# Patient Record
Sex: FEMALE | Race: WHITE | NOT HISPANIC OR LATINO | Employment: OTHER | RURAL
[De-identification: names, ages, dates, MRNs, and addresses within clinical notes are randomized per-mention and may not be internally consistent; named-entity substitution may affect disease eponyms.]

---

## 2017-11-21 ENCOUNTER — HOSPITAL ENCOUNTER (OUTPATIENT)
Dept: PHYSICAL THERAPY | Facility: HOSPITAL | Age: 67
Setting detail: RECURRING SERIES
Discharge: HOME | End: 2017-12-19
Attending: INTERNAL MEDICINE | Admitting: INTERNAL MEDICINE

## 2018-02-05 ENCOUNTER — HOSPITAL ENCOUNTER (OUTPATIENT)
Dept: PAIN MEDICINE | Facility: HOSPITAL | Age: 68
Discharge: HOME | End: 2018-02-05
Attending: ANESTHESIOLOGY | Admitting: ANESTHESIOLOGY

## 2018-03-01 ENCOUNTER — HOSPITAL ENCOUNTER (OUTPATIENT)
Dept: GENERAL RADIOLOGY | Facility: HOSPITAL | Age: 68
Discharge: HOME | End: 2018-03-01
Attending: ANESTHESIOLOGY | Admitting: ANESTHESIOLOGY

## 2018-03-01 ENCOUNTER — HOSPITAL ENCOUNTER (OUTPATIENT)
Dept: PAIN MEDICINE | Facility: HOSPITAL | Age: 68
Discharge: HOME | End: 2018-03-01
Attending: ANESTHESIOLOGY | Admitting: ANESTHESIOLOGY

## 2018-03-15 ENCOUNTER — HOSPITAL ENCOUNTER (OUTPATIENT)
Dept: PAIN MEDICINE | Facility: HOSPITAL | Age: 68
Discharge: HOME | End: 2018-03-15
Attending: ANESTHESIOLOGY | Admitting: ANESTHESIOLOGY

## 2018-10-03 ENCOUNTER — HOSPITAL ENCOUNTER (OUTPATIENT)
Dept: ONCOLOGY | Facility: HOSPITAL | Age: 68
Discharge: HOME | End: 2018-10-03
Attending: INTERNAL MEDICINE | Admitting: INTERNAL MEDICINE

## 2018-10-03 ENCOUNTER — CLINICAL SUPPORT (OUTPATIENT)
Dept: ONCOLOGY | Facility: HOSPITAL | Age: 68
End: 2018-10-03

## 2018-10-03 ENCOUNTER — HOSPITAL ENCOUNTER (OUTPATIENT)
Dept: ONCOLOGY | Facility: CLINIC | Age: 68
Setting detail: INFUSION SERIES
Discharge: HOME | End: 2018-10-03
Attending: INTERNAL MEDICINE | Admitting: INTERNAL MEDICINE

## 2018-10-03 LAB
ALBUMIN SERPL-MCNC: 4 G/DL (ref 3.5–4.8)
ALBUMIN/GLOB SERPL: 1.3 {RATIO} (ref 1–1.7)
ALP SERPL-CCNC: 86 IU/L (ref 32–91)
ALT SERPL-CCNC: 15 IU/L (ref 14–54)
ANION GAP SERPL CALC-SCNC: 17.2 MMOL/L (ref 10–20)
AST SERPL-CCNC: 22 IU/L (ref 15–41)
BILIRUB SERPL-MCNC: 1 MG/DL (ref 0.3–1.2)
BUN SERPL-MCNC: 14 MG/DL (ref 8–20)
BUN/CREAT SERPL: 15.6 (ref 5.4–26.2)
CALCIUM SERPL-MCNC: 9.9 MG/DL (ref 8.9–10.3)
CHLORIDE SERPL-SCNC: 103 MMOL/L (ref 101–111)
CONV CO2: 25 MMOL/L (ref 22–32)
CONV TOTAL PROTEIN: 7.2 G/DL (ref 6.1–7.9)
CREAT UR-MCNC: 0.9 MG/DL (ref 0.4–1)
GLOBULIN UR ELPH-MCNC: 3.2 G/DL (ref 2.5–3.8)
GLUCOSE SERPL-MCNC: 95 MG/DL (ref 65–99)
POTASSIUM SERPL-SCNC: 4.2 MMOL/L (ref 3.6–5.1)
SODIUM SERPL-SCNC: 141 MMOL/L (ref 136–144)

## 2018-10-03 NOTE — PROGRESS NOTES
PATIENTS ONCOLOGY RECORD LOCATED IN [facility]      Subjective     Name:  JENNIFER SWEENEY     Date:  10/03/2018  Address:  [address] [place] [address]  Home: [phone]  :  1950 AGE:  68 y.o.        RECORDS OBTAINED:  Patients Oncology Record is located in [facility]

## 2018-10-11 ENCOUNTER — HOSPITAL ENCOUNTER (OUTPATIENT)
Dept: OTHER | Facility: HOSPITAL | Age: 68
Discharge: HOME | End: 2018-10-11
Attending: INTERNAL MEDICINE | Admitting: INTERNAL MEDICINE

## 2018-11-08 ENCOUNTER — CLINICAL SUPPORT (OUTPATIENT)
Dept: ONCOLOGY | Facility: HOSPITAL | Age: 68
End: 2018-11-08

## 2018-11-08 ENCOUNTER — HOSPITAL ENCOUNTER (OUTPATIENT)
Dept: ONCOLOGY | Facility: CLINIC | Age: 68
Setting detail: INFUSION SERIES
Discharge: HOME | End: 2018-11-08
Attending: INTERNAL MEDICINE | Admitting: INTERNAL MEDICINE

## 2018-11-08 NOTE — PROGRESS NOTES
PATIENTS ONCOLOGY RECORD LOCATED IN [facility]      Subjective     Name:  JENNIFER SWEENEY     Date:  2018  Address:  [address] [place] [address]  Home: [phone]  :  1950 AGE:  68 y.o.        RECORDS OBTAINED:  Patients Oncology Record is located in [facility]

## 2019-02-19 ENCOUNTER — OFFICE (AMBULATORY)
Dept: URBAN - METROPOLITAN AREA CLINIC 64 | Facility: CLINIC | Age: 69
End: 2019-02-19

## 2019-02-19 VITALS
HEIGHT: 66 IN | WEIGHT: 150 LBS | DIASTOLIC BLOOD PRESSURE: 99 MMHG | SYSTOLIC BLOOD PRESSURE: 178 MMHG | HEART RATE: 71 BPM

## 2019-02-19 DIAGNOSIS — Z79.01 LONG TERM (CURRENT) USE OF ANTICOAGULANTS: ICD-10-CM

## 2019-02-19 DIAGNOSIS — Z12.11 ENCOUNTER FOR SCREENING FOR MALIGNANT NEOPLASM OF COLON: ICD-10-CM

## 2019-02-19 PROCEDURE — 99203 OFFICE O/P NEW LOW 30 MIN: CPT | Performed by: NURSE PRACTITIONER

## 2019-03-05 ENCOUNTER — HOSPITAL ENCOUNTER (OUTPATIENT)
Dept: OTHER | Facility: HOSPITAL | Age: 69
Setting detail: SPECIMEN
Discharge: HOME | End: 2019-03-05
Attending: INTERNAL MEDICINE | Admitting: INTERNAL MEDICINE

## 2019-03-05 ENCOUNTER — ON CAMPUS - OUTPATIENT (AMBULATORY)
Dept: URBAN - METROPOLITAN AREA HOSPITAL 2 | Facility: HOSPITAL | Age: 69
End: 2019-03-05
Payer: COMMERCIAL

## 2019-03-05 ENCOUNTER — OFFICE (AMBULATORY)
Dept: URBAN - METROPOLITAN AREA PATHOLOGY 4 | Facility: PATHOLOGY | Age: 69
End: 2019-03-05

## 2019-03-05 VITALS
DIASTOLIC BLOOD PRESSURE: 100 MMHG | SYSTOLIC BLOOD PRESSURE: 106 MMHG | SYSTOLIC BLOOD PRESSURE: 104 MMHG | HEART RATE: 82 BPM | WEIGHT: 145 LBS | DIASTOLIC BLOOD PRESSURE: 59 MMHG | DIASTOLIC BLOOD PRESSURE: 86 MMHG | DIASTOLIC BLOOD PRESSURE: 79 MMHG | HEIGHT: 66 IN | DIASTOLIC BLOOD PRESSURE: 72 MMHG | SYSTOLIC BLOOD PRESSURE: 137 MMHG | DIASTOLIC BLOOD PRESSURE: 76 MMHG | HEART RATE: 88 BPM | SYSTOLIC BLOOD PRESSURE: 184 MMHG | SYSTOLIC BLOOD PRESSURE: 120 MMHG | OXYGEN SATURATION: 98 % | HEART RATE: 83 BPM | HEART RATE: 74 BPM | HEART RATE: 85 BPM | HEART RATE: 78 BPM | HEART RATE: 80 BPM | SYSTOLIC BLOOD PRESSURE: 115 MMHG | DIASTOLIC BLOOD PRESSURE: 66 MMHG | SYSTOLIC BLOOD PRESSURE: 143 MMHG | SYSTOLIC BLOOD PRESSURE: 175 MMHG | RESPIRATION RATE: 20 BRPM | OXYGEN SATURATION: 96 % | RESPIRATION RATE: 18 BRPM | SYSTOLIC BLOOD PRESSURE: 147 MMHG | DIASTOLIC BLOOD PRESSURE: 71 MMHG | HEART RATE: 84 BPM | RESPIRATION RATE: 16 BRPM | DIASTOLIC BLOOD PRESSURE: 70 MMHG | DIASTOLIC BLOOD PRESSURE: 69 MMHG | OXYGEN SATURATION: 100 % | OXYGEN SATURATION: 99 %

## 2019-03-05 DIAGNOSIS — D12.2 BENIGN NEOPLASM OF ASCENDING COLON: ICD-10-CM

## 2019-03-05 DIAGNOSIS — D12.0 BENIGN NEOPLASM OF CECUM: ICD-10-CM

## 2019-03-05 DIAGNOSIS — D12.3 BENIGN NEOPLASM OF TRANSVERSE COLON: ICD-10-CM

## 2019-03-05 DIAGNOSIS — Z12.11 ENCOUNTER FOR SCREENING FOR MALIGNANT NEOPLASM OF COLON: ICD-10-CM

## 2019-03-05 DIAGNOSIS — K57.30 DIVERTICULOSIS OF LARGE INTESTINE WITHOUT PERFORATION OR ABS: ICD-10-CM

## 2019-03-05 LAB
GI HISTOLOGY: A. UNSPECIFIED: (no result)
GI HISTOLOGY: B. UNSPECIFIED: (no result)
GI HISTOLOGY: C. UNSPECIFIED: (no result)
GI HISTOLOGY: PDF REPORT: (no result)

## 2019-03-05 PROCEDURE — 45385 COLONOSCOPY W/LESION REMOVAL: CPT | Mod: PT | Performed by: INTERNAL MEDICINE

## 2019-03-05 PROCEDURE — 88305 TISSUE EXAM BY PATHOLOGIST: CPT | Mod: 26 | Performed by: INTERNAL MEDICINE

## 2019-03-07 ENCOUNTER — HOSPITAL ENCOUNTER (OUTPATIENT)
Dept: ONCOLOGY | Facility: CLINIC | Age: 69
Setting detail: INFUSION SERIES
Discharge: HOME | End: 2019-03-07
Attending: INTERNAL MEDICINE | Admitting: INTERNAL MEDICINE

## 2019-03-07 ENCOUNTER — CLINICAL SUPPORT (OUTPATIENT)
Dept: ONCOLOGY | Facility: HOSPITAL | Age: 69
End: 2019-03-07

## 2019-03-07 NOTE — PROGRESS NOTES
PATIENTS ONCOLOGY RECORD LOCATED IN [facility]      Subjective     Name:  JENNIFER SWEENEY     Date:  2019  Address:  [address] [place] [address]  Home: [unfilled]  :  1950 AGE:  68 y.o.        RECORDS OBTAINED:  Patients Oncology Record is located in [facility]

## 2019-07-10 DIAGNOSIS — I82.4Y1 DEEP VEIN THROMBOSIS (DVT) OF PROXIMAL VEIN OF RIGHT LOWER EXTREMITY, UNSPECIFIED CHRONICITY (HCC): Primary | ICD-10-CM

## 2019-07-10 PROBLEM — R76.0 LUPUS ANTICOAGULANT POSITIVE: Status: ACTIVE | Noted: 2019-07-10

## 2019-07-10 PROBLEM — I82.401 ACUTE DEEP VEIN THROMBOSIS (DVT) OF RIGHT LOWER EXTREMITY (HCC): Status: ACTIVE | Noted: 2019-07-10

## 2019-07-10 PROBLEM — R79.1 ELEVATED FACTOR VIII LEVEL: Status: ACTIVE | Noted: 2019-07-10

## 2019-07-10 PROBLEM — I26.99 BILATERAL PULMONARY EMBOLISM (HCC): Status: ACTIVE | Noted: 2019-07-10

## 2019-07-10 PROBLEM — R91.1 NODULE OF UPPER LOBE OF RIGHT LUNG: Status: ACTIVE | Noted: 2019-07-10

## 2019-07-10 NOTE — PROGRESS NOTES
Hematology/Oncology Outpatient Follow Up    Patient name:Karol Cid  :1950  MRN: [id number]  Primary Care Physician: Chel Luna MD  Referring Physician: Chel Luna MD    Chief Complaint   Patient presents with   • Right lower extremity DVT     follow up         History of Present Illness:   1.   Right lower extremity DVT and bilateral pulmonary emboli with saddle embolus diagnosed 2018.   • 3/27/18 - Patient was hospitalized at [facility] between 3/27/18 and 18 through the Emergency Room with weakness and confusion. The patient had received lumbar injections for degenerative disc disease with third injection given approximately two weeks prior to admission. Spouse reported patient had been very active prior to back pain with some decrease in activity prior to admission. Neurology saw patient and the LP was positive for varicella zoster. ID was consulted and patient started on Acyclovir in addition to antibiotics. Cymbalta was ordered for confusion. WBC was 13.1, hemoglobin 14.6, platelet count 297,000. D-dimer was elevated at 6.49 (0.17-0.59). CT abdomen and pelvis had marked rectosigmoid wall thickening with associated stranding consistent with non-specific colitis/proctitis. Bibasilar atelectasis with small left pleural effusion were seen. MRI brain had no acute intracranial pathology. Chronic changes of volume loss and microvascular ischemia were noted. CT chest PE protocol revealed bilateral pulmonary emboli including a saddle embolus. Minor basilar atelectasis and tiny left pleural effusions were seen. MRI lumbar spine had revealed mild degenerative changes with mild right-sided neuroforaminal stenosis at L5-S1 and L4-L5. Echocardiogram had mild tricuspid regurgitation with LV size and contractility normal with EF about 60%. Bilateral lower extremity venous Doppler revealed isolated right gastrocnemius vein DVT. Factor VIII 165 (). Comprehensive metabolic panel had no significant  abnormality. C-diff was positive, treated with oral Vancomycin. Lupus anticoagulant was weakly positive. Antiphospholipid antibodies were negative. Prothrombin [id number] mutation was not detected. Factor V Leiden mutation screen, APC-RV 3.0 (>2). HIV 1/2  antibody negative. Protein C activity 105.2 (), protein S activity 83.7 (). Patient was threated with low therapeutic doses of Lovenox changed to Eliquis prior to discharge.   • 4/9/18 - Patient had followup appointment to see me in the office but was hospitalized at [facility] again between 4/9/18 and 4/16/18 with acute renal failure, herpes zoster encephalitis, C-diff colitis and accelerated hypertension. The renal failure was thought secondary to Acyclovir toxicity and she was eventually discharged home.   • 9/5/18 - Patient was seen in followup by her primary care physician, Chel Luna M.D., wanting to come off Eliquis and was referred here for further evaluation.   • 10/3/18 - [facility] followup of her hospitalization at [facility]. WBC 14.4 with 59% neutrophils, 36% lymphocytes, 4% monocytes, hemoglobin 14.7, platelet count 324,000.  Factor VIII activity 129 (). Antithrombin III activity 134 (). D-dimer <0.22 (0.45).      • 10/11/18 - CT chest with contrast with no evidence of pulmonary embolism with interval resolution of previously-seen multifocal pulmonary emboli. Upper lung predominant emphysematous changes and scarring. Stable 3 mm right upper lobe pulmonary nodule. Venous Doppler right lower extremity with no evidence of any DVT or SVT. Lymph node noted in the right groin.   • 11/8/18 - Discussed workup results. Patient wanting to come off the Eliquis. Made her aware that she should come off Raloxifene or change to a different medicine as that will be a risk factor for her to get blood clots again. Eliquis dose decreased to 2.5 mg p.o. b.i.d. Homocysteine 13.2 (<15).  • 3/5/19 colonoscopy by  Cristhian Mosley MD revealed 3 to 9 mm polyps in the cecum, ascending colon and transverse colon which on pathology were tubular adenomas.  Repeat colonoscopy recommended in 3 years.  •  3/7/19 - Patient claims to have stopped using the Raloxifene in January 2019 and had a followup appointment with Dr. Luna. Has not been taking Eliquis since her recent colonoscopy and asked patient to discontinue it as she is not on Raloxifene anymore.  Factor VIII activity 152 ().  • 7/11/19 discussed elevated factor VIII level and lupus anticoagulant positivity with patient.  Recommended aspirin 81 mg p.o. daily.  Made her aware that these values need to be repeated.  Patient claims that she is being followed by Dr. Price every 3 months and will have these repeated by her.  Changed patient follow-up to as needed.    Past Medical History:   Diagnosis Date   • Anxiety disorder    • Depression    • Osteoporosis        Past Surgical History:   Procedure Laterality Date   • CATARACT EXTRACTION, BILATERAL  04/2012   • SKIN CANCER EXCISION Left     Skin cancer removed over left eye         Current Outpatient Medications:   •  Calcium Citrate-Vitamin D 200-125 MG-UNIT tablet, CALCIUM + D TABS, Disp: , Rfl:   •  mirtazapine (REMERON) 30 MG tablet, Daily., Disp: , Rfl:   •  Wheat Dextrin (BENEFIBER PO), Take  by mouth., Disp: , Rfl:   •  clonazePAM (KlonoPIN) 0.5 MG tablet, , Disp: , Rfl:   •  latanoprost (XALATAN) 0.005 % ophthalmic solution, , Disp: , Rfl:   •  metoprolol tartrate (LOPRESSOR) 50 MG tablet, , Disp: , Rfl:     No Known Allergies    No family history on file.    Cancer-related family history is not on file.    Social History     Tobacco Use   • Smoking status: Former Smoker   Substance Use Topics   • Alcohol use: Not on file   • Drug use: Not on file       I have reviewed the history of present illness, past medical history, family history, social history, lab results, all notes and other records since the  "patient was last seen on 3/7/19.    SUBJECTIVE: Patient is here for follow up of right lower extremity DVT and bilateral pulmonary emboli.     MYRNA Botello present during office visit.           ROS:  Review of Systems   Constitutional: Negative for chills and fever.   HENT: Negative for ear pain, mouth sores, nosebleeds and sore throat.    Eyes: Negative for photophobia and visual disturbance.   Respiratory: Negative for wheezing and stridor.    Cardiovascular: Negative for chest pain and palpitations.   Gastrointestinal: Negative for abdominal pain, diarrhea, nausea and vomiting.   Endocrine: Negative for cold intolerance and heat intolerance.   Genitourinary: Negative for dysuria and hematuria.   Musculoskeletal: Negative for joint swelling and neck stiffness.   Skin: Negative for color change and rash.        Scattered ecchymosis    Neurological: Negative for seizures and syncope.   Hematological: Negative for adenopathy.        No obvious bleeding   Psychiatric/Behavioral: Negative for agitation, confusion and hallucinations.       Objective:    Vitals:    07/11/19 1048   BP: 148/90   Pulse: 79   Resp: 18   Temp: 98.4 °F (36.9 °C)   Weight: 71.7 kg (158 lb)   Height: 165.1 cm (65\")   PainSc: 0-No pain       ECOG  (1) Restricted in physically strenuous activity, ambulatory and able to do work of light nature    Physical Exam   Constitutional: She is oriented to person, place, and time. No distress.   HENT:   Head: Normocephalic and atraumatic.   Eyes: Conjunctivae and EOM are normal. Right eye exhibits no discharge. Left eye exhibits no discharge. No scleral icterus.   Neck: Normal range of motion. Neck supple. No thyromegaly present.   Cardiovascular: Normal rate, regular rhythm and normal heart sounds. Exam reveals no gallop and no friction rub.   Pulmonary/Chest: Effort normal. No stridor. No respiratory distress. She has no wheezes.   Abdominal: Soft. Bowel sounds are normal. She exhibits no mass. " There is no tenderness. There is no rebound and no guarding.   Musculoskeletal: Normal range of motion. She exhibits no tenderness.   Trace pedal edema.    Lymphadenopathy:     She has no cervical adenopathy.   Neurological: She is alert and oriented to person, place, and time. She exhibits normal muscle tone.   Skin: Skin is warm. No rash noted. She is not diaphoretic. No erythema.   Psychiatric: She has a normal mood and affect. Her behavior is normal.   Nursing note and vitals reviewed.      RECENT LABS  WBC   Date Value Ref Range Status   07/11/2019 10.88 (H) 3.40 - 10.80 10*3/mm3 Final     RBC   Date Value Ref Range Status   07/11/2019 4.95 3.77 - 5.28 10*6/mm3 Final     Hemoglobin   Date Value Ref Range Status   07/11/2019 14.3 12.0 - 15.9 g/dL Final     Hematocrit   Date Value Ref Range Status   07/11/2019 44.2 34.0 - 46.6 % Final     MCV   Date Value Ref Range Status   07/11/2019 89.3 79.0 - 97.0 fL Final     MCH   Date Value Ref Range Status   07/11/2019 28.9 26.6 - 33.0 pg Final     MCHC   Date Value Ref Range Status   07/11/2019 32.4 31.5 - 35.7 g/dL Final     RDW   Date Value Ref Range Status   07/11/2019 13.4 12.3 - 15.4 % Final     RDW-SD   Date Value Ref Range Status   07/11/2019 43.0 37.0 - 54.0 fl Final     MPV   Date Value Ref Range Status   07/11/2019 10.3 6.0 - 12.0 fL Final     Platelets   Date Value Ref Range Status   07/11/2019 307 140 - 450 10*3/mm3 Final     Neutrophil %   Date Value Ref Range Status   07/11/2019 47.2 42.7 - 76.0 % Final     Lymphocyte %   Date Value Ref Range Status   07/11/2019 45.1 19.6 - 45.3 % Final     Monocyte %   Date Value Ref Range Status   07/11/2019 6.3 5.0 - 12.0 % Final     Eosinophil %   Date Value Ref Range Status   07/11/2019 1.3 0.3 - 6.2 % Final     Basophil %   Date Value Ref Range Status   07/11/2019 0.1 0.0 - 1.5 % Final     Neutrophils, Absolute   Date Value Ref Range Status   07/11/2019 5.14 1.70 - 7.00 10*3/mm3 Final     Lymphocytes, Absolute   Date  Value Ref Range Status   07/11/2019 4.91 (H) 0.70 - 3.10 10*3/mm3 Final     Monocytes, Absolute   Date Value Ref Range Status   07/11/2019 0.68 0.10 - 0.90 10*3/mm3 Final     Eosinophils, Absolute   Date Value Ref Range Status   07/11/2019 0.14 0.00 - 0.40 10*3/mm3 Final     Basophils, Absolute   Date Value Ref Range Status   07/11/2019 0.01 0.00 - 0.20 10*3/mm3 Final     nRBC   Date Value Ref Range Status   04/16/2018 0 0 /100[WBCs] Final       Lab Results   Component Value Date    GLUCOSE 95 10/03/2018    BUN 14 10/03/2018    CREATININE 0.9 10/03/2018    BCR 15.6 10/03/2018    K 4.2 10/03/2018    CO2 25 10/03/2018    CALCIUM 9.9 10/03/2018    ALBUMIN 4.0 10/03/2018    LABIL2 1.3 10/03/2018    AST 22 10/03/2018    ALT 15 10/03/2018         Assessment/Plan     Deep vein thrombosis (DVT) of proximal vein of right lower extremity, unspecified chronicity (CMS/HCC)  - CBC & Differential  - CBC Auto Differential    Bilateral pulmonary embolism (CMS/HCC)    Elevated factor VIII level    Lupus anticoagulant positive    Nodule of upper lobe of right lung      ASSESSMENT:  She is not on any anticoagulant elevated factor VIII level lupus anticoagulant positivity.  Advised for her to take aspirin 81 mg p.o. daily.  Recommended for her to have the factor VIII level and lupus anticoagulant repeated.  She claims that she sees Dr. Luna every 3 months and will have her repeated by her.  Her lung nodules are stable.      PLAN:  Start ASA 81mg po daily.  Return to clinic as needed.      I have reviewed labs results, imaging, vitals, and medications with the patient today. Will follow up as needed.     Patient verbalized understanding and is in agreement of the above plan.    I have reviewed and validated the information above.   Herbie Dean M.D., F.A.C.P.             Much of the above report is an electronic transcription//translation of the spoken language to printed text using Dragon Software. As such, the subtleties and  finesse of the spoken language may permit erroneous, or at times, nonsensical words or phrases to be inadvertently transcribed; thus changes may be made at a later date to rectify these errors.

## 2019-07-11 ENCOUNTER — APPOINTMENT (OUTPATIENT)
Dept: LAB | Facility: HOSPITAL | Age: 69
End: 2019-07-11

## 2019-07-11 ENCOUNTER — OFFICE VISIT (OUTPATIENT)
Dept: ONCOLOGY | Facility: CLINIC | Age: 69
End: 2019-07-11

## 2019-07-11 VITALS
HEART RATE: 79 BPM | HEIGHT: 65 IN | RESPIRATION RATE: 18 BRPM | SYSTOLIC BLOOD PRESSURE: 148 MMHG | BODY MASS INDEX: 26.33 KG/M2 | TEMPERATURE: 98.4 F | DIASTOLIC BLOOD PRESSURE: 90 MMHG | WEIGHT: 158 LBS

## 2019-07-11 DIAGNOSIS — I26.99 BILATERAL PULMONARY EMBOLISM (HCC): ICD-10-CM

## 2019-07-11 DIAGNOSIS — R79.1 ELEVATED FACTOR VIII LEVEL: ICD-10-CM

## 2019-07-11 DIAGNOSIS — R91.1 NODULE OF UPPER LOBE OF RIGHT LUNG: ICD-10-CM

## 2019-07-11 DIAGNOSIS — I82.4Y1 DEEP VEIN THROMBOSIS (DVT) OF PROXIMAL VEIN OF RIGHT LOWER EXTREMITY, UNSPECIFIED CHRONICITY (HCC): Primary | ICD-10-CM

## 2019-07-11 DIAGNOSIS — R76.0 LUPUS ANTICOAGULANT POSITIVE: ICD-10-CM

## 2019-07-11 LAB
BASOPHILS # BLD AUTO: 0.01 10*3/MM3 (ref 0–0.2)
BASOPHILS NFR BLD AUTO: 0.1 % (ref 0–1.5)
DEPRECATED RDW RBC AUTO: 43 FL (ref 37–54)
EOSINOPHIL # BLD AUTO: 0.14 10*3/MM3 (ref 0–0.4)
EOSINOPHIL NFR BLD AUTO: 1.3 % (ref 0.3–6.2)
ERYTHROCYTE [DISTWIDTH] IN BLOOD BY AUTOMATED COUNT: 13.4 % (ref 12.3–15.4)
HCT VFR BLD AUTO: 44.2 % (ref 34–46.6)
HGB BLD-MCNC: 14.3 G/DL (ref 12–15.9)
LYMPHOCYTES # BLD AUTO: 4.91 10*3/MM3 (ref 0.7–3.1)
LYMPHOCYTES NFR BLD AUTO: 45.1 % (ref 19.6–45.3)
MCH RBC QN AUTO: 28.9 PG (ref 26.6–33)
MCHC RBC AUTO-ENTMCNC: 32.4 G/DL (ref 31.5–35.7)
MCV RBC AUTO: 89.3 FL (ref 79–97)
MONOCYTES # BLD AUTO: 0.68 10*3/MM3 (ref 0.1–0.9)
MONOCYTES NFR BLD AUTO: 6.3 % (ref 5–12)
NEUTROPHILS # BLD AUTO: 5.14 10*3/MM3 (ref 1.7–7)
NEUTROPHILS NFR BLD AUTO: 47.2 % (ref 42.7–76)
PLATELET # BLD AUTO: 307 10*3/MM3 (ref 140–450)
PMV BLD AUTO: 10.3 FL (ref 6–12)
RBC # BLD AUTO: 4.95 10*6/MM3 (ref 3.77–5.28)
WBC NRBC COR # BLD: 10.88 10*3/MM3 (ref 3.4–10.8)

## 2019-07-11 PROCEDURE — 85025 COMPLETE CBC W/AUTO DIFF WBC: CPT | Performed by: INTERNAL MEDICINE

## 2019-07-11 PROCEDURE — 99213 OFFICE O/P EST LOW 20 MIN: CPT | Performed by: INTERNAL MEDICINE

## 2019-07-11 PROCEDURE — 36415 COLL VENOUS BLD VENIPUNCTURE: CPT | Performed by: INTERNAL MEDICINE

## 2019-07-11 RX ORDER — MIRTAZAPINE 30 MG/1
TABLET, FILM COATED ORAL EVERY 24 HOURS
COMMUNITY
Start: 2018-01-18

## 2019-07-11 RX ORDER — METOPROLOL TARTRATE 50 MG/1
TABLET, FILM COATED ORAL
COMMUNITY
Start: 2019-05-27

## 2019-07-11 RX ORDER — CLONAZEPAM 0.5 MG/1
TABLET ORAL
COMMUNITY
Start: 2019-06-23

## 2019-07-11 RX ORDER — LATANOPROST 50 UG/ML
SOLUTION/ DROPS OPHTHALMIC
COMMUNITY
Start: 2019-06-23

## 2021-09-13 ENCOUNTER — TRANSCRIBE ORDERS (OUTPATIENT)
Dept: ADMINISTRATIVE | Facility: HOSPITAL | Age: 71
End: 2021-09-13

## 2021-09-13 DIAGNOSIS — Z12.31 ENCOUNTER FOR SCREENING MAMMOGRAM FOR MALIGNANT NEOPLASM OF BREAST: Primary | ICD-10-CM

## 2022-04-15 ENCOUNTER — OFFICE (AMBULATORY)
Dept: URBAN - METROPOLITAN AREA PATHOLOGY 4 | Facility: PATHOLOGY | Age: 72
End: 2022-04-15

## 2022-04-15 ENCOUNTER — ON CAMPUS - OUTPATIENT (AMBULATORY)
Dept: URBAN - METROPOLITAN AREA HOSPITAL 2 | Facility: HOSPITAL | Age: 72
End: 2022-04-15

## 2022-04-15 ENCOUNTER — OFFICE (AMBULATORY)
Dept: URBAN - METROPOLITAN AREA PATHOLOGY 4 | Facility: PATHOLOGY | Age: 72
End: 2022-04-15
Payer: COMMERCIAL

## 2022-04-15 VITALS
DIASTOLIC BLOOD PRESSURE: 67 MMHG | DIASTOLIC BLOOD PRESSURE: 60 MMHG | SYSTOLIC BLOOD PRESSURE: 114 MMHG | SYSTOLIC BLOOD PRESSURE: 110 MMHG | SYSTOLIC BLOOD PRESSURE: 116 MMHG | TEMPERATURE: 97.5 F | RESPIRATION RATE: 18 BRPM | SYSTOLIC BLOOD PRESSURE: 84 MMHG | SYSTOLIC BLOOD PRESSURE: 136 MMHG | HEART RATE: 78 BPM | RESPIRATION RATE: 16 BRPM | OXYGEN SATURATION: 96 % | DIASTOLIC BLOOD PRESSURE: 61 MMHG | RESPIRATION RATE: 14 BRPM | DIASTOLIC BLOOD PRESSURE: 45 MMHG | SYSTOLIC BLOOD PRESSURE: 133 MMHG | HEIGHT: 66 IN | DIASTOLIC BLOOD PRESSURE: 71 MMHG | HEART RATE: 72 BPM | SYSTOLIC BLOOD PRESSURE: 118 MMHG | HEART RATE: 74 BPM | DIASTOLIC BLOOD PRESSURE: 85 MMHG | OXYGEN SATURATION: 99 % | OXYGEN SATURATION: 93 % | SYSTOLIC BLOOD PRESSURE: 158 MMHG | WEIGHT: 168 LBS | HEART RATE: 77 BPM | HEART RATE: 75 BPM | DIASTOLIC BLOOD PRESSURE: 116 MMHG

## 2022-04-15 DIAGNOSIS — Z86.010 PERSONAL HISTORY OF COLONIC POLYPS: ICD-10-CM

## 2022-04-15 DIAGNOSIS — K62.1 RECTAL POLYP: ICD-10-CM

## 2022-04-15 DIAGNOSIS — D12.3 BENIGN NEOPLASM OF TRANSVERSE COLON: ICD-10-CM

## 2022-04-15 DIAGNOSIS — K57.30 DIVERTICULOSIS OF LARGE INTESTINE WITHOUT PERFORATION OR ABS: ICD-10-CM

## 2022-04-15 PROBLEM — K63.5 POLYP OF COLON: Status: ACTIVE | Noted: 2022-04-15

## 2022-04-15 LAB
GI HISTOLOGY: A. UNSPECIFIED: (no result)
GI HISTOLOGY: B. UNSPECIFIED: (no result)
GI HISTOLOGY: PDF REPORT: (no result)

## 2022-04-15 PROCEDURE — 88305 TISSUE EXAM BY PATHOLOGIST: CPT | Mod: 26 | Performed by: INTERNAL MEDICINE

## 2022-04-15 PROCEDURE — 45385 COLONOSCOPY W/LESION REMOVAL: CPT | Mod: PT | Performed by: INTERNAL MEDICINE

## 2023-10-27 ENCOUNTER — TRANSCRIBE ORDERS (OUTPATIENT)
Dept: PHYSICAL THERAPY | Facility: CLINIC | Age: 73
End: 2023-10-27
Payer: MEDICARE

## 2023-10-27 DIAGNOSIS — M17.12 PRIMARY OSTEOARTHRITIS OF LEFT KNEE: Primary | ICD-10-CM

## 2023-11-06 ENCOUNTER — TREATMENT (OUTPATIENT)
Dept: PHYSICAL THERAPY | Facility: CLINIC | Age: 73
End: 2023-11-06
Payer: MEDICARE

## 2023-11-06 DIAGNOSIS — M17.12 ARTHRITIS OF LEFT KNEE: ICD-10-CM

## 2023-11-06 DIAGNOSIS — M25.562 LEFT KNEE PAIN, UNSPECIFIED CHRONICITY: Primary | ICD-10-CM

## 2023-11-06 PROCEDURE — 97161 PT EVAL LOW COMPLEX 20 MIN: CPT | Performed by: PHYSICAL THERAPIST

## 2023-11-06 NOTE — PROGRESS NOTES
"Physical Therapy Initial Evaluation and Plan of Care  [address] [place] [address]     Patient: Karol Cid   : 1950  Diagnosis/ICD-10 Code:  Left knee pain, unspecified chronicity [M25.562]  Referring practitioner: Conner Sebastian MD  Date of Initial Visit: 2023  Today's Date: 2023  Patient seen for 1 sessions           Visit Diagnoses:    ICD-10-CM ICD-9-CM   1. Left knee pain, unspecified chronicity  M25.562 719.46   2. Arthritis of left knee  M17.12 716.96       Subjective Questionnaire: Knee outcomes score 59%      Subjective 72 yo female with c/o L knee pain. Reports insidious onset of pain a few months ago. Primary pain is along the anteromedial knee. Had some imaging, unsure of results. Had a knee injections at orthopedic visit, \" I guess it helped.\" 0/10 knee pain 5/10 at worst. Symptom improve with Tylenol. Symptoms worsen when ambulating stairs. Further exacerbating and alleviating factors unknown.   MD follow up: 3 months  Social hx: Retired .      Objective          Tenderness   Left Knee   Tenderness in the medial joint line.     Active Range of Motion   Left Knee   Normal active range of motion  Flexion: with pain    Right Knee   Normal active range of motion    Strength/Myotome Testing     Left Knee   Flexion: 4-  Extension: 4-    Right Knee   Normal strength    Additional Strength Details  Pain with resisted flexion    Tests     Left Knee   Positive medial Anna and Thessaly's test at 5 degrees.     Additional Tests Details  Further provocative testing is negative      Unable to maintain single leg standing on the left, right is wfl    Assessment & Plan       Assessment  Impairments: abnormal gait, abnormal or restricted ROM, activity intolerance, impaired balance, impaired physical strength, lacks appropriate home exercise program, pain with function and weight-bearing intolerance   Functional limitations: carrying objects, lifting, " walking, pulling, pushing, uncomfortable because of pain, standing and unable to perform repetitive tasks   Assessment details: 74 yo female with c/o L knee pain. Pt tolerated today's evaluation well. Recommend continuing with PT evaluation and treatment to address her impairments.  Prognosis: good    Goals  Plan Goals: Short term goals, 1 week: Tolerate HEP progression.  Voice compliance with activity modification.  Report improvement in symptoms.    LTGs, 5 weeks: Improve knee outcomes score to 75%.  AROM flexion to be non provocative of pain to allow more effective stair ambulation.  5/5 LE strength to allow safe stair ambulation.  Single leg standing to be 4 seconds or better on level surface to help mitigate fall risk.  Independent with HEP.    Plan  Therapy options: will be seen for skilled therapy services  Other planned modality interventions: modalities prn  Planned therapy interventions: balance/weight-bearing training, flexibility, functional ROM exercises, gait training, home exercise program, manual therapy, neuromuscular re-education, strengthening, stretching and therapeutic activities  Frequency: 2x week  Duration in weeks: 5  Treatment plan discussed with: patient  Plan details: 30 visits per POC, 90 day certification period         History # of Personal Factors and/or Comorbidities: MODERATE (1-2)  Examination of Body System(s): # of elements: LOW (1-2)  Clinical Presentation: STABLE   Clinical Decision Making: LOW      Timed:         Manual Therapy:         mins  00568;     Therapeutic Exercise:         mins  05912;     Neuromuscular Yenifer:        mins  84818;    Therapeutic Activity:          mins  89128;     Gait Training:           mins  53146;     Ultrasound:          mins  24241;    Ionto                                   mins   09725  Self Care                            mins   62367    Un-Timed:  Electrical Stimulation:         mins  02678 ( );  Traction          mins 29986  Low  Eval     25     Mins  41122  Mod Eval          Mins  06815  High Eval                            Mins  20490  Re-Eval                               mins  88701        Timed Treatment:   0   mins   Total Treatment:     25   mins      PT SIGNATURE: Petros Thomason PT, DPT, OCS  [place] license: [id number]  DATE TREATMENT INITIATED: 11/6/2023    Certification Period: @TDA @thru 2/3/2024  I certify that the therapy services are furnished while this patient is under my care.  The services outlined above are required for this patient and will be reviewed every 90 days.     PHYSICIAN: _____________________________    Conner Sebastian MD      DATE:     Please sign and return via fax to [unfilled] . Thank you, [facility].

## 2023-11-09 ENCOUNTER — TREATMENT (OUTPATIENT)
Dept: PHYSICAL THERAPY | Facility: CLINIC | Age: 73
End: 2023-11-09
Payer: MEDICARE

## 2023-11-09 DIAGNOSIS — M17.12 ARTHRITIS OF LEFT KNEE: ICD-10-CM

## 2023-11-09 DIAGNOSIS — M25.562 LEFT KNEE PAIN, UNSPECIFIED CHRONICITY: Primary | ICD-10-CM

## 2023-11-09 PROCEDURE — 97140 MANUAL THERAPY 1/> REGIONS: CPT | Performed by: PHYSICAL THERAPIST

## 2023-11-09 PROCEDURE — 97110 THERAPEUTIC EXERCISES: CPT | Performed by: PHYSICAL THERAPIST

## 2023-11-09 NOTE — PROGRESS NOTES
Physical Therapy Daily Treatment Note  [facility]  [address] [place] [address]     Patient: Karol Cid   : 1950   Referring practitioner: Conner Sebastian MD  Date of initial visit: Type: THERAPY  Noted: 2023   Today's date: 2023   Patient seen for 2 visits    Visit Diagnoses:    ICD-10-CM ICD-9-CM   1. Left knee pain, unspecified chronicity  M25.562 719.46   2. Arthritis of left knee  M17.12 716.96       Subjective   Pt reports: No new complaints vs IE date.       Objective     See Exercise, Manual, and Modality Logs for complete treatment.     Patient Education: HEP    Assessment/Plan Tolerated HEP introduction well.      Progress per Plan of Care            Timed:         Manual Therapy:    15     mins  22206;     Therapeutic Exercise:    15     mins  10953;     Neuromuscular Yenifer:        mins  73399;    Therapeutic Activity:          mins  88887;     Gait Training:           mins  48510;     Ultrasound:          mins  72303;    Ionto                                   mins   68283  Self Care                            mins   79028    Un-Timed:  Electrical Stimulation:         mins  55103 ( );  Traction          mins 84401  Low Eval          Mins  61572  Mod Eval          Mins  11006  High Eval                            Mins  73238  Re-Eval                               mins  99793    Timed Treatment:   30   mins   Total Treatment:     30   mins      Petros Thomason PT, DPT, OCS  [place] license: [id number]  Physical Therapist

## 2023-11-13 ENCOUNTER — TREATMENT (OUTPATIENT)
Dept: PHYSICAL THERAPY | Facility: CLINIC | Age: 73
End: 2023-11-13
Payer: MEDICARE

## 2023-11-13 DIAGNOSIS — M25.562 LEFT KNEE PAIN, UNSPECIFIED CHRONICITY: Primary | ICD-10-CM

## 2023-11-13 DIAGNOSIS — M17.12 ARTHRITIS OF LEFT KNEE: ICD-10-CM

## 2023-11-13 PROCEDURE — 97140 MANUAL THERAPY 1/> REGIONS: CPT | Performed by: PHYSICAL THERAPIST

## 2023-11-13 PROCEDURE — 97110 THERAPEUTIC EXERCISES: CPT | Performed by: PHYSICAL THERAPIST

## 2023-11-14 NOTE — PROGRESS NOTES
Physical Therapy Daily Treatment Note  [facility]  [address] [place] [address]     Patient: Karol Cid   : 1950   Referring practitioner: Conner Sebastian MD  Date of initial visit: Type: THERAPY  Noted: 2023   Today's date: 2023   Patient seen for 3 visits    Visit Diagnoses:    ICD-10-CM ICD-9-CM   1. Left knee pain, unspecified chronicity  M25.562 719.46   2. Arthritis of left knee  M17.12 716.96       Subjective   Pt reports: Knee pain somewhat improved. HEP going well.      Objective     See Exercise, Manual, and Modality Logs for complete treatment.     Patient Education: HEP    Assessment/Plan LE fatigued post visit.      Progress per Plan of Care            Timed:         Manual Therapy:    10     mins  73425;     Therapeutic Exercise:    20     mins  58454;     Neuromuscular Yenifer:        mins  23728;    Therapeutic Activity:          mins  85944;     Gait Training:           mins  03448;     Ultrasound:          mins  68510;    Ionto                                   mins   68121  Self Care                            mins   45314    Un-Timed:  Electrical Stimulation:         mins  55285 ( );  Traction          mins 60114  Low Eval          Mins  95084  Mod Eval          Mins  58475  High Eval                            Mins  01661  Re-Eval                               mins  58764    Timed Treatment:   30   mins   Total Treatment:     30   mins      Petros Thomason PT, DPT, OCS  [place] license: [id number]  Physical Therapist

## 2023-11-16 ENCOUNTER — TREATMENT (OUTPATIENT)
Dept: PHYSICAL THERAPY | Facility: CLINIC | Age: 73
End: 2023-11-16
Payer: MEDICARE

## 2023-11-16 DIAGNOSIS — M17.12 ARTHRITIS OF LEFT KNEE: ICD-10-CM

## 2023-11-16 DIAGNOSIS — M25.562 LEFT KNEE PAIN, UNSPECIFIED CHRONICITY: Primary | ICD-10-CM

## 2023-11-16 PROCEDURE — 97110 THERAPEUTIC EXERCISES: CPT | Performed by: PHYSICAL THERAPIST

## 2023-11-16 PROCEDURE — 97140 MANUAL THERAPY 1/> REGIONS: CPT | Performed by: PHYSICAL THERAPIST

## 2023-11-16 NOTE — PROGRESS NOTES
Physical Therapy Daily Treatment Note  [facility]  [address] [place] [address]     Patient: Karol Cid   : 1950   Referring practitioner: Conner Sebastian MD  Date of initial visit: Type: THERAPY  Noted: 2023   Today's date: 2023   Patient seen for 4 visits    Visit Diagnoses:    ICD-10-CM ICD-9-CM   1. Left knee pain, unspecified chronicity  M25.562 719.46   2. Arthritis of left knee  M17.12 716.96       Subjective   Pt reports: Knee pain is improving. HEP going well.      Objective     See Exercise, Manual, and Modality Logs for complete treatment.     Patient Education: HEP    Assessment/Plan Fatigued post visit, no increase in pain.      Progress per Plan of Care            Timed:         Manual Therapy:    10     mins  91434;     Therapeutic Exercise:    20     mins  26867;     Neuromuscular Yenifer:        mins  74592;    Therapeutic Activity:          mins  46774;     Gait Training:           mins  18261;     Ultrasound:          mins  21265;    Ionto                                   mins   14124  Self Care                            mins   68194    Un-Timed:  Electrical Stimulation:         mins  35377 ( );  Traction          mins 73678  Low Eval          Mins  57783  Mod Eval          Mins  31831  High Eval                            Mins  38806  Re-Eval                               mins  65156    Timed Treatment:   30   mins   Total Treatment:     30   mins      Petros Thomason, PT, DPT, OCS  [place] license: [id number]  Physical Therapist

## 2023-11-20 ENCOUNTER — TREATMENT (OUTPATIENT)
Dept: PHYSICAL THERAPY | Facility: CLINIC | Age: 73
End: 2023-11-20
Payer: MEDICARE

## 2023-11-20 DIAGNOSIS — M17.12 ARTHRITIS OF LEFT KNEE: ICD-10-CM

## 2023-11-20 DIAGNOSIS — M25.562 LEFT KNEE PAIN, UNSPECIFIED CHRONICITY: Primary | ICD-10-CM

## 2023-11-20 PROCEDURE — 97140 MANUAL THERAPY 1/> REGIONS: CPT | Performed by: PHYSICAL THERAPIST

## 2023-11-20 PROCEDURE — 97110 THERAPEUTIC EXERCISES: CPT | Performed by: PHYSICAL THERAPIST

## 2023-11-20 NOTE — PROGRESS NOTES
"Physical Therapy Daily Treatment Note    Patient: Karol Cid  : 1950  Referring practitioner: Conner Sebastian MD  Today's Date: 2023    VISIT#: 5      Subjective   Karol Cid reports: Doing ok, knee feeling better overall.       Objective     See Exercise, Manual, and Modality Logs for complete treatment.     Patient Education: continue HEP    Assessment/Plan  Good response to session, she did report intermittent knee \"discomfort\" but no pain throughout.     Progress per Plan of Care            Timed:         Manual Therapy:    8     mins  33450;     Therapeutic Exercise:    22     mins  12378;     Neuromuscular Yenifer:        mins  65669;    Therapeutic Activity:          mins  74854;     Gait Training:           mins  80460;     Ultrasound:          mins  12168;    Ionto:                                   mins   21028  Self Care:                            mins   43371    Un-Timed:  Electrical Stimulation:         mins  31767 ( );  Dry Needling          mins self-pay  Traction          mins 19424  Re-Eval                               mins  89618    Timed Treatment:   30   mins   Total Treatment:     30   mins    Monika Aldridge, PT  Physical Therapist  [place] PT license #: [id number]  [place] PT license #: [id number]  "

## 2023-11-21 ENCOUNTER — TREATMENT (OUTPATIENT)
Dept: PHYSICAL THERAPY | Facility: CLINIC | Age: 73
End: 2023-11-21
Payer: MEDICARE

## 2023-11-21 DIAGNOSIS — M25.562 LEFT KNEE PAIN, UNSPECIFIED CHRONICITY: Primary | ICD-10-CM

## 2023-11-21 DIAGNOSIS — M17.12 ARTHRITIS OF LEFT KNEE: ICD-10-CM

## 2023-11-21 PROCEDURE — 97110 THERAPEUTIC EXERCISES: CPT | Performed by: PHYSICAL THERAPIST

## 2023-11-21 PROCEDURE — 97140 MANUAL THERAPY 1/> REGIONS: CPT | Performed by: PHYSICAL THERAPIST

## 2023-11-21 NOTE — PROGRESS NOTES
Physical Therapy Daily Treatment Note  [facility]  [address] [place] [address]     Patient: Karol Cid   : 1950   Referring practitioner: Conner Sebastian MD  Date of initial visit: Type: THERAPY  Noted: 2023   Today's date: 2023   Patient seen for 6 visits    Visit Diagnoses:    ICD-10-CM ICD-9-CM   1. Left knee pain, unspecified chronicity  M25.562 719.46   2. Arthritis of left knee  M17.12 716.96       Subjective   Pt reports: Knee pain and function have returned to prior level. Pt feels confident with her HEP and ready for DC.      Objective     See Exercise, Manual, and Modality Logs for complete treatment.     Patient Education: HEP    Assessment/Plan Pt appears to be near prior level of function, independent with her HEP. Pt to try 2-3 weeks of independent HEP then DC if this goes well.       Progress per Plan of Care            Timed:         Manual Therapy:    10     mins  70720;     Therapeutic Exercise:    20     mins  78841;     Neuromuscular Yenifer:        mins  95263;    Therapeutic Activity:          mins  77361;     Gait Training:           mins  40038;     Ultrasound:          mins  97978;    Ionto                                   mins   65925  Self Care                            mins   06765    Un-Timed:  Electrical Stimulation:         mins  21935 ( );  Traction          mins 97614  Low Eval          Mins  11923  Mod Eval          Mins  31655  High Eval                            Mins  72870  Re-Eval                               mins  70771    Timed Treatment:   30   mins   Total Treatment:     30   mins      Petros Thomason, PT, DPT, OCS  [place] license: [id number]  Physical Therapist